# Patient Record
Sex: FEMALE | URBAN - METROPOLITAN AREA
[De-identification: names, ages, dates, MRNs, and addresses within clinical notes are randomized per-mention and may not be internally consistent; named-entity substitution may affect disease eponyms.]

---

## 2020-02-12 ENCOUNTER — IMPORTED ENCOUNTER (OUTPATIENT)
Dept: URBAN - METROPOLITAN AREA CLINIC 38 | Facility: CLINIC | Age: 65
End: 2020-02-12

## 2020-02-12 PROBLEM — H00.12 CHALAZION RIGHT LOWER EYELID: Noted: 2020-02-12

## 2020-02-12 PROBLEM — H02.889 MGD OF EYE: Noted: 2020-02-12

## 2020-02-12 PROCEDURE — 92002 INTRM OPH EXAM NEW PATIENT: CPT

## 2022-07-02 ASSESSMENT — KERATOMETRY
OD_AXISANGLE2_DEGREES: 76
OS_K1POWER_DIOPTERS: 41.00
OD_AXISANGLE_DEGREES: 166
OD_K1POWER_DIOPTERS: 40.50
OS_AXISANGLE2_DEGREES: 121
OD_K2POWER_DIOPTERS: 41.25
OS_K2POWER_DIOPTERS: 41.75
OS_AXISANGLE_DEGREES: 31

## 2022-07-02 ASSESSMENT — VISUAL ACUITY
OD_CC: 20/40-1
OS_CC: 20/30-1

## 2022-07-02 ASSESSMENT — TONOMETRY
OD_IOP_MMHG: 17
OS_IOP_MMHG: 15

## 2023-06-24 ENCOUNTER — APPOINTMENT (EMERGENCY)
Dept: RADIOLOGY | Facility: HOSPITAL | Age: 68
End: 2023-06-24
Payer: COMMERCIAL

## 2023-06-24 ENCOUNTER — HOSPITAL ENCOUNTER (EMERGENCY)
Facility: HOSPITAL | Age: 68
Discharge: HOME | End: 2023-06-24
Attending: EMERGENCY MEDICINE | Admitting: EMERGENCY MEDICINE
Payer: COMMERCIAL

## 2023-06-24 VITALS
RESPIRATION RATE: 14 BRPM | OXYGEN SATURATION: 100 % | SYSTOLIC BLOOD PRESSURE: 143 MMHG | HEART RATE: 65 BPM | DIASTOLIC BLOOD PRESSURE: 70 MMHG | BODY MASS INDEX: 19.99 KG/M2 | TEMPERATURE: 97.4 F | HEIGHT: 63 IN | WEIGHT: 112.8 LBS

## 2023-06-24 DIAGNOSIS — M54.2 NECK PAIN: Primary | ICD-10-CM

## 2023-06-24 PROCEDURE — 72125 CT NECK SPINE W/O DYE: CPT | Mod: ME

## 2023-06-24 PROCEDURE — 72040 X-RAY EXAM NECK SPINE 2-3 VW: CPT

## 2023-06-24 PROCEDURE — 99284 EMERGENCY DEPT VISIT MOD MDM: CPT | Mod: 25

## 2023-06-24 PROCEDURE — 63700000 HC SELF-ADMINISTRABLE DRUG: Performed by: PHYSICIAN ASSISTANT

## 2023-06-24 RX ORDER — LIDOCAINE 560 MG/1
1 PATCH PERCUTANEOUS; TOPICAL; TRANSDERMAL DAILY
Qty: 30 PATCH | Refills: 0 | Status: SHIPPED | OUTPATIENT
Start: 2023-06-24 | End: 2023-06-24 | Stop reason: SDUPTHER

## 2023-06-24 RX ORDER — LIDOCAINE 560 MG/1
1 PATCH PERCUTANEOUS; TOPICAL; TRANSDERMAL ONCE
Status: DISCONTINUED | OUTPATIENT
Start: 2023-06-24 | End: 2023-06-24 | Stop reason: HOSPADM

## 2023-06-24 RX ORDER — LIDOCAINE 560 MG/1
1 PATCH PERCUTANEOUS; TOPICAL; TRANSDERMAL DAILY
Qty: 30 PATCH | Refills: 0 | Status: SHIPPED | OUTPATIENT
Start: 2023-06-24

## 2023-06-24 RX ADMIN — LIDOCAINE 1 PATCH: 4 PATCH TOPICAL at 15:18

## 2023-06-24 ASSESSMENT — ENCOUNTER SYMPTOMS
COUGH: 0
VOMITING: 0
ABDOMINAL PAIN: 0
HEADACHES: 0
FEVER: 0
NUMBNESS: 0
WEAKNESS: 0
SHORTNESS OF BREATH: 0
WOUND: 0
DIZZINESS: 0
NECK PAIN: 1
BACK PAIN: 0

## 2023-06-24 NOTE — ED ATTESTATION NOTE
The patient was evaluated and managed by the physician assistant / nurse practitioner.       Denny Menchaca MD  06/24/23 2257

## 2023-06-24 NOTE — ED PROVIDER NOTES
"Emergency Medicine Note  HPI   HISTORY OF PRESENT ILLNESS         67 y/o female presents for evaluation following neck injury that occurred just prior to arrival. States she is visiting family and was doing a somersault with grandchildren and felt a \"snap\" in right side of neck. Has had right neck pain radiating to right shoulder since. Took 600 mg ibuprofen prior to arrival. On Tylenol and muscle relaxers at baseline for chronic pelvic floor pain. No headache, vision or hearing changes, arm pain, focal weakness, paresthesias, CP, or SOB.            Patient History   PAST HISTORY     Reviewed from Nursing Triage:  Tobacco  Allergies  Meds  Problems  Med Hx  Surg Hx  Fam Hx  Soc   Hx      Past Medical History:   Diagnosis Date   • Pelvic floor dysfunction        History reviewed. No pertinent surgical history.    History reviewed. No pertinent family history.    Social History     Tobacco Use   • Smoking status: Never   • Smokeless tobacco: Never   Substance Use Topics   • Alcohol use: Never   • Drug use: Never         Review of Systems   REVIEW OF SYSTEMS     Review of Systems   Constitutional: Negative for fever.   Eyes: Negative for visual disturbance.   Respiratory: Negative for cough and shortness of breath.    Cardiovascular: Negative for chest pain.   Gastrointestinal: Negative for abdominal pain and vomiting.   Musculoskeletal: Positive for neck pain. Negative for back pain.   Skin: Negative for rash and wound.   Neurological: Negative for dizziness, weakness, numbness and headaches.         VITALS     ED Vitals    Date/Time Temp Pulse Resp BP SpO2 [facility]   06/24/23 1652 -- 65 14 143/70 100 % MB   06/24/23 1400 36.3 °C (97.4 °F) 79 16 110/65 100 % BM        Pulse Ox %: 100 % (06/24/23 1530)  Pulse Ox Interpretation: Normal (06/24/23 1530)           Physical Exam   PHYSICAL EXAM     Physical Exam  Vitals and nursing note reviewed.   Constitutional:       Appearance: Normal appearance.   HENT:      Head: " Normocephalic and atraumatic.      Nose: Nose normal.   Eyes:      Conjunctiva/sclera: Conjunctivae normal.   Neck:      Comments: No midline tenderness  Mild tenderness of right lateral neck muscles  Cardiovascular:      Rate and Rhythm: Normal rate.   Pulmonary:      Effort: Pulmonary effort is normal.   Musculoskeletal:      Cervical back: Neck supple.      Comments: Full ROM of right shoulder  5/5 strength and normal NV exam of RUE   Skin:     General: Skin is warm and dry.   Neurological:      General: No focal deficit present.      Mental Status: She is alert.   Psychiatric:         Mood and Affect: Mood normal.           PROCEDURES     Procedures     DATA     Results     None          Imaging Results          CT CERVICAL SPINE WITHOUT IV CONTRAST (Final result)  Result time 06/24/23 17:32:21    Final result                 Impression:    IMPRESSION:      1. No acute cervical spine fracture.  2. Straightening and slight reversal of the normal cervical lordosis with trace  anterolisthesis of C2 on C3 and C3 on C4.  3. If there is any concern for ligamentous injury, then MRI could be performed.             Narrative:    INDICATION: Right neck pain after somersault.    CT of the cervical spine was performed without intravenous contrast. Sagittal  and coronal reformatted images were obtained.  Automated exposure control was  used.    COMPARISON: Cervical spine radiograph 6/24/2023    FINDINGS:    There is no acute cervical spine fracture or acute subluxation. There is trace  anterolisthesis of C2 on C3 and C3 on C4. The vertebral body height is  preserved. There is no prevertebral soft tissue swelling. There is straightening  and some reversal the normal cervical lordosis.    There are subcentimeter thyroid nodules.                               X-RAY CERVICAL SPINE 2 OR 3 VIEWS (Final result)  Result time 06/24/23 15:16:32    Final result                 Impression:    IMPRESSION:    Mild cervical spondylosis.              Narrative:    CLINICAL HISTORY: Injury with pain.    4 views of the cervical spine.    COMPARISON: None available.    COMMENT:    There is normal cervical alignment. There is straightening of the normal  cervical lordosis. The vertebral body height is preserved. There is disc space  narrowing at C4-5, C5-6 and C6-7. The pedicles and facet joints are normally  aligned. There is mild facet arthrosis. There is no prevertebral soft tissue  swelling.                                No orders to display       Scoring tools                                  ED Course & MDM   MDM / ED COURSE / CLINICAL IMPRESSION / DISPO     Medical Decision Making  Amount and/or Complexity of Data Reviewed  Radiology: ordered and independent interpretation performed. Decision-making details documented in ED Course.      Risk  OTC drugs.          ED Course as of 06/24/23 1737   Sat Jun 24, 2023   1529 Normal VS. Well-appearing. No midline pain/tenderness. No s/sx cord compression. Suspect muscular injury. Plan for CT. [TT]   1735 No acute findings. Appears very comfortable. Walking up and down hallway. Plan for supportive care at home and close f/u with PCP. [TT]      ED Course User Index  [TT] Aixa Murillo PA C     Clinical Impression      Neck pain     _________________     ED Disposition   Discharge                   Aixa Murillo PA C  06/24/23 1737